# Patient Record
Sex: MALE | Race: BLACK OR AFRICAN AMERICAN | NOT HISPANIC OR LATINO | URBAN - METROPOLITAN AREA
[De-identification: names, ages, dates, MRNs, and addresses within clinical notes are randomized per-mention and may not be internally consistent; named-entity substitution may affect disease eponyms.]

---

## 2017-01-27 ENCOUNTER — FOLLOW UP ESTABLISHED (OUTPATIENT)
Dept: URBAN - METROPOLITAN AREA CLINIC 56 | Facility: CLINIC | Age: 57
End: 2017-01-27
Payer: OTHER GOVERNMENT

## 2017-01-27 DIAGNOSIS — H20.12 CHRONIC IRIDOCYCLITIS, LEFT EYE: Primary | ICD-10-CM

## 2017-01-27 PROCEDURE — 92014 COMPRE OPH EXAM EST PT 1/>: CPT | Performed by: OPTOMETRIST

## 2017-01-27 RX ORDER — PREDNISOLONE ACETATE 10 MG/ML
1 % SUSPENSION/ DROPS OPHTHALMIC
Qty: 1 | Refills: 0 | Status: INACTIVE
Start: 2017-01-27 | End: 2018-08-03

## 2017-01-27 ASSESSMENT — INTRAOCULAR PRESSURE
OS: 15
OD: 15

## 2017-01-27 ASSESSMENT — VISUAL ACUITY
OS: 20/25
OD: 20/20

## 2017-01-27 ASSESSMENT — KERATOMETRY
OS: 43.65
OD: 43.75

## 2018-08-03 ENCOUNTER — FOLLOW UP ESTABLISHED (OUTPATIENT)
Dept: URBAN - METROPOLITAN AREA CLINIC 56 | Facility: CLINIC | Age: 58
End: 2018-08-03
Payer: OTHER GOVERNMENT

## 2018-08-03 DIAGNOSIS — H35.071 RETINAL TELANGIECTASIS, RIGHT EYE: ICD-10-CM

## 2018-08-03 PROCEDURE — 92015 DETERMINE REFRACTIVE STATE: CPT | Performed by: OPTOMETRIST

## 2018-08-03 PROCEDURE — 92134 CPTRZ OPH DX IMG PST SGM RTA: CPT | Performed by: OPTOMETRIST

## 2018-08-03 PROCEDURE — 92014 COMPRE OPH EXAM EST PT 1/>: CPT | Performed by: OPTOMETRIST

## 2018-08-03 RX ORDER — PREDNISOLONE ACETATE 10 MG/ML
1 % SUSPENSION/ DROPS OPHTHALMIC
Qty: 1 | Refills: 1 | Status: INACTIVE
Start: 2018-08-03 | End: 2020-12-07

## 2018-08-03 ASSESSMENT — KERATOMETRY
OS: 43.93
OD: 43.72

## 2018-08-03 ASSESSMENT — INTRAOCULAR PRESSURE
OD: 18
OS: 15

## 2018-08-03 ASSESSMENT — VISUAL ACUITY
OS: 20/20
OD: 20/20

## 2020-12-07 ENCOUNTER — FOLLOW UP ESTABLISHED (OUTPATIENT)
Dept: URBAN - METROPOLITAN AREA CLINIC 56 | Facility: CLINIC | Age: 60
End: 2020-12-07
Payer: OTHER GOVERNMENT

## 2020-12-07 DIAGNOSIS — H52.4 PRESBYOPIA: ICD-10-CM

## 2020-12-07 DIAGNOSIS — H25.11 AGE-RELATED NUCLEAR CATARACT, RIGHT EYE: ICD-10-CM

## 2020-12-07 PROCEDURE — 92014 COMPRE OPH EXAM EST PT 1/>: CPT | Performed by: OPTOMETRIST

## 2020-12-07 PROCEDURE — 92134 CPTRZ OPH DX IMG PST SGM RTA: CPT | Performed by: OPTOMETRIST

## 2020-12-07 PROCEDURE — 92015 DETERMINE REFRACTIVE STATE: CPT | Performed by: OPTOMETRIST

## 2020-12-07 RX ORDER — PREDNISOLONE ACETATE 10 MG/ML
1 % SUSPENSION/ DROPS OPHTHALMIC
Qty: 5 | Refills: 1 | Status: ACTIVE
Start: 2020-12-07

## 2020-12-07 ASSESSMENT — KERATOMETRY
OS: 43.37
OD: 43.75

## 2020-12-07 ASSESSMENT — INTRAOCULAR PRESSURE
OD: 14
OS: 11

## 2020-12-07 ASSESSMENT — VISUAL ACUITY
OD: 20/25
OS: 20/20

## 2022-06-14 ENCOUNTER — OFFICE VISIT (OUTPATIENT)
Dept: URBAN - METROPOLITAN AREA CLINIC 56 | Facility: CLINIC | Age: 62
End: 2022-06-14
Payer: OTHER GOVERNMENT

## 2022-06-14 DIAGNOSIS — H43.812 VITREOUS DEGENERATION, LEFT EYE: ICD-10-CM

## 2022-06-14 DIAGNOSIS — H20.12 CHRONIC IRIDOCYCLITIS, LEFT EYE: Primary | ICD-10-CM

## 2022-06-14 DIAGNOSIS — H25.13 AGE-RELATED NUCLEAR CATARACT, BILATERAL: ICD-10-CM

## 2022-06-14 DIAGNOSIS — H35.071 RETINAL TELANGIECTASIS, RIGHT EYE: ICD-10-CM

## 2022-06-14 PROCEDURE — 92014 COMPRE OPH EXAM EST PT 1/>: CPT | Performed by: OPTOMETRIST

## 2022-06-14 PROCEDURE — 92134 CPTRZ OPH DX IMG PST SGM RTA: CPT | Performed by: OPTOMETRIST

## 2022-06-14 ASSESSMENT — INTRAOCULAR PRESSURE
OS: 14
OD: 14

## 2022-06-14 ASSESSMENT — VISUAL ACUITY
OS: 20/20
OD: 20/25

## 2022-06-14 ASSESSMENT — KERATOMETRY
OS: 43.88
OD: 43.84

## 2022-06-14 NOTE — IMPRESSION/PLAN
Impression: Chronic iridocyclitis, left eye Plan: Quiet today. Had bilateral episode 6 months ago, drops resolved. Patient to return if any recurrence.

## 2022-06-14 NOTE — IMPRESSION/PLAN
Impression: Retinal telangiectasis, right eye Plan: No change in OCT macula. Monitor vision monocularly. Recommend patient be followed yearly with DFE and mac OCT.

## 2023-10-13 ENCOUNTER — OFFICE VISIT (OUTPATIENT)
Dept: URBAN - METROPOLITAN AREA CLINIC 56 | Facility: LOCATION | Age: 63
End: 2023-10-13
Payer: OTHER GOVERNMENT

## 2023-10-13 DIAGNOSIS — H52.4 PRESBYOPIA: ICD-10-CM

## 2023-10-13 DIAGNOSIS — H35.071 RETINAL TELANGIECTASIS, RIGHT EYE: Primary | ICD-10-CM

## 2023-10-13 DIAGNOSIS — H43.21 CRYSTALLINE DEPOSITS IN VITREOUS BODY, RIGHT EYE: ICD-10-CM

## 2023-10-13 DIAGNOSIS — H20.12 CHRONIC IRIDOCYCLITIS, LEFT EYE: ICD-10-CM

## 2023-10-13 DIAGNOSIS — H43.812 VITREOUS DEGENERATION, LEFT EYE: ICD-10-CM

## 2023-10-13 DIAGNOSIS — H25.13 AGE-RELATED NUCLEAR CATARACT, BILATERAL: ICD-10-CM

## 2023-10-13 PROCEDURE — 92014 COMPRE OPH EXAM EST PT 1/>: CPT | Performed by: OPTOMETRIST

## 2023-10-13 PROCEDURE — 92134 CPTRZ OPH DX IMG PST SGM RTA: CPT | Performed by: OPTOMETRIST

## 2023-10-13 RX ORDER — PREDNISOLONE ACETATE 10 MG/ML
1 % SUSPENSION/ DROPS OPHTHALMIC
Qty: 5 | Refills: 0 | Status: ACTIVE
Start: 2023-10-13

## 2023-10-13 ASSESSMENT — INTRAOCULAR PRESSURE
OD: 15
OS: 15

## 2023-10-13 ASSESSMENT — KERATOMETRY
OD: 43.73
OS: 43.68

## 2023-10-13 ASSESSMENT — VISUAL ACUITY
OD: 20/25
OS: 20/20